# Patient Record
Sex: MALE | Race: WHITE | HISPANIC OR LATINO | URBAN - METROPOLITAN AREA
[De-identification: names, ages, dates, MRNs, and addresses within clinical notes are randomized per-mention and may not be internally consistent; named-entity substitution may affect disease eponyms.]

---

## 2019-01-30 ENCOUNTER — EMERGENCY (EMERGENCY)
Facility: HOSPITAL | Age: 29
LOS: 1 days | Discharge: HOME | End: 2019-01-30
Attending: EMERGENCY MEDICINE
Payer: SELF-PAY

## 2019-01-30 VITALS
WEIGHT: 179.9 LBS | HEIGHT: 68 IN | DIASTOLIC BLOOD PRESSURE: 84 MMHG | TEMPERATURE: 98 F | SYSTOLIC BLOOD PRESSURE: 116 MMHG | RESPIRATION RATE: 20 BRPM | OXYGEN SATURATION: 97 % | HEART RATE: 76 BPM

## 2019-01-30 VITALS
TEMPERATURE: 98 F | RESPIRATION RATE: 20 BRPM | OXYGEN SATURATION: 98 % | DIASTOLIC BLOOD PRESSURE: 93 MMHG | SYSTOLIC BLOOD PRESSURE: 124 MMHG | HEART RATE: 105 BPM

## 2019-01-30 DIAGNOSIS — F43.29 ADJUSTMENT DISORDER WITH OTHER SYMPTOMS: ICD-10-CM

## 2019-01-30 LAB
ALBUMIN SERPL ELPH-MCNC: 4.5 G/DL — SIGNIFICANT CHANGE UP (ref 3.3–5.2)
ALP SERPL-CCNC: 48 U/L — SIGNIFICANT CHANGE UP (ref 40–120)
ALT FLD-CCNC: 24 U/L — SIGNIFICANT CHANGE UP
AMPHET UR-MCNC: NEGATIVE — SIGNIFICANT CHANGE UP
ANION GAP SERPL CALC-SCNC: 13 MMOL/L — SIGNIFICANT CHANGE UP (ref 5–17)
AST SERPL-CCNC: 14 U/L — SIGNIFICANT CHANGE UP
BARBITURATES UR SCN-MCNC: NEGATIVE — SIGNIFICANT CHANGE UP
BASOPHILS # BLD AUTO: 0 K/UL — SIGNIFICANT CHANGE UP (ref 0–0.2)
BASOPHILS NFR BLD AUTO: 0.2 % — SIGNIFICANT CHANGE UP (ref 0–2)
BENZODIAZ UR-MCNC: NEGATIVE — SIGNIFICANT CHANGE UP
BILIRUB SERPL-MCNC: 0.3 MG/DL — LOW (ref 0.4–2)
BUN SERPL-MCNC: 15 MG/DL — SIGNIFICANT CHANGE UP (ref 8–20)
CALCIUM SERPL-MCNC: 9.2 MG/DL — SIGNIFICANT CHANGE UP (ref 8.6–10.2)
CHLORIDE SERPL-SCNC: 102 MMOL/L — SIGNIFICANT CHANGE UP (ref 98–107)
CO2 SERPL-SCNC: 26 MMOL/L — SIGNIFICANT CHANGE UP (ref 22–29)
COCAINE METAB.OTHER UR-MCNC: NEGATIVE — SIGNIFICANT CHANGE UP
CREAT SERPL-MCNC: 0.52 MG/DL — SIGNIFICANT CHANGE UP (ref 0.5–1.3)
EOSINOPHIL # BLD AUTO: 0.4 K/UL — SIGNIFICANT CHANGE UP (ref 0–0.5)
EOSINOPHIL NFR BLD AUTO: 3.4 % — SIGNIFICANT CHANGE UP (ref 0–5)
ETHANOL SERPL-MCNC: <10 MG/DL — SIGNIFICANT CHANGE UP
GLUCOSE SERPL-MCNC: 110 MG/DL — SIGNIFICANT CHANGE UP (ref 70–115)
HCT VFR BLD CALC: 40.9 % — LOW (ref 42–52)
HGB BLD-MCNC: 13.5 G/DL — LOW (ref 14–18)
LYMPHOCYTES # BLD AUTO: 27.1 % — SIGNIFICANT CHANGE UP (ref 20–55)
LYMPHOCYTES # BLD AUTO: 3.3 K/UL — SIGNIFICANT CHANGE UP (ref 1–4.8)
MCHC RBC-ENTMCNC: 26.7 PG — LOW (ref 27–31)
MCHC RBC-ENTMCNC: 33 G/DL — SIGNIFICANT CHANGE UP (ref 32–36)
MCV RBC AUTO: 80.8 FL — SIGNIFICANT CHANGE UP (ref 80–94)
METHADONE UR-MCNC: NEGATIVE — SIGNIFICANT CHANGE UP
MONOCYTES # BLD AUTO: 0.8 K/UL — SIGNIFICANT CHANGE UP (ref 0–0.8)
MONOCYTES NFR BLD AUTO: 7.1 % — SIGNIFICANT CHANGE UP (ref 3–10)
NEUTROPHILS # BLD AUTO: 7.5 K/UL — SIGNIFICANT CHANGE UP (ref 1.8–8)
NEUTROPHILS NFR BLD AUTO: 62 % — SIGNIFICANT CHANGE UP (ref 37–73)
OPIATES UR-MCNC: NEGATIVE — SIGNIFICANT CHANGE UP
PCP SPEC-MCNC: SIGNIFICANT CHANGE UP
PCP UR-MCNC: NEGATIVE — SIGNIFICANT CHANGE UP
PLATELET # BLD AUTO: 260 K/UL — SIGNIFICANT CHANGE UP (ref 150–400)
POTASSIUM SERPL-MCNC: 3.2 MMOL/L — LOW (ref 3.5–5.3)
POTASSIUM SERPL-SCNC: 3.2 MMOL/L — LOW (ref 3.5–5.3)
PROT SERPL-MCNC: 7.2 G/DL — SIGNIFICANT CHANGE UP (ref 6.6–8.7)
RBC # BLD: 5.06 M/UL — SIGNIFICANT CHANGE UP (ref 4.6–6.2)
RBC # FLD: 13.1 % — SIGNIFICANT CHANGE UP (ref 11–15.6)
SODIUM SERPL-SCNC: 141 MMOL/L — SIGNIFICANT CHANGE UP (ref 135–145)
THC UR QL: NEGATIVE — SIGNIFICANT CHANGE UP
WBC # BLD: 12 K/UL — HIGH (ref 4.8–10.8)
WBC # FLD AUTO: 12 K/UL — HIGH (ref 4.8–10.8)

## 2019-01-30 PROCEDURE — 99284 EMERGENCY DEPT VISIT MOD MDM: CPT | Mod: 25

## 2019-01-30 PROCEDURE — 90792 PSYCH DIAG EVAL W/MED SRVCS: CPT | Mod: GT

## 2019-01-30 PROCEDURE — 85027 COMPLETE CBC AUTOMATED: CPT

## 2019-01-30 PROCEDURE — 80307 DRUG TEST PRSMV CHEM ANLYZR: CPT

## 2019-01-30 PROCEDURE — 80053 COMPREHEN METABOLIC PANEL: CPT

## 2019-01-30 PROCEDURE — 99053 MED SERV 10PM-8AM 24 HR FAC: CPT

## 2019-01-30 PROCEDURE — 36415 COLL VENOUS BLD VENIPUNCTURE: CPT

## 2019-01-30 PROCEDURE — 93010 ELECTROCARDIOGRAM REPORT: CPT

## 2019-01-30 PROCEDURE — 93005 ELECTROCARDIOGRAM TRACING: CPT

## 2019-01-30 PROCEDURE — 99285 EMERGENCY DEPT VISIT HI MDM: CPT

## 2019-01-30 RX ORDER — POTASSIUM CHLORIDE 20 MEQ
40 PACKET (EA) ORAL ONCE
Qty: 0 | Refills: 0 | Status: COMPLETED | OUTPATIENT
Start: 2019-01-30 | End: 2019-01-30

## 2019-01-30 RX ADMIN — Medication 40 MILLIEQUIVALENT(S): at 03:30

## 2019-01-30 NOTE — ED ADULT NURSE REASSESSMENT NOTE - NS ED NURSE REASSESS COMMENT FT1
pt arrives to unit in yellow gown waned  for contraband by security.  pt yawning during interview.  poor eye contact.  minimal verbalization.  pt oriented to unit and surrounding made comfortable plan of care explained awaiting eval

## 2019-01-30 NOTE — ED BEHAVIORAL HEALTH ASSESSMENT NOTE - DETAILS
"yes I have firearms" per collateral vague suicidal statement when upset last night. no other hx of suicidality known. d/w mother, with whom patient resides

## 2019-01-30 NOTE — ED BEHAVIORAL HEALTH ASSESSMENT NOTE - SUMMARY
Patient is a 29 y/o M with no reported psych/medical hx, brought  in by EMS apparently activated by family because of reported concerns about suicidal ideation. Patient is a 27 y/o M with no reported psych/medical hx, brought  in by EMS apparently activated by family because of reported concerns about suicidal ideation.    patient presents as calm, somewhat evasive on interview. denies psych sx aside from stress. collateral reveals recent breakup and custody issues as major stressor. state patient made vague/passive suicidal statement once yesterday, with no hx of any psych illness or of any suicidality in past. collateral/patient w/o safety concerns.     no indication for involuntary commitment, and patient is not interested in voluntary. appropriate for d/c with outpatient resources.

## 2019-01-30 NOTE — ED BEHAVIORAL HEALTH ASSESSMENT NOTE - DESCRIPTION
denies domiciled with family. self employed. owns additional home in [place]. calm and in behavioral control w/o PRNs

## 2019-01-30 NOTE — ED BEHAVIORAL HEALTH ASSESSMENT NOTE - RISK ASSESSMENT
low acute risk  chronic rf: poor coping skills, owns firearms (though not currently in possession due to PD intervention)   acute rf: reported suicidal statement  pf: future oriented, no psych hx, no substance abuse

## 2019-01-30 NOTE — ED ADULT NURSE REASSESSMENT NOTE - DESCRIPTION
arrives to unit in yellow gown report received.  as per pt I was driving around and they thought I was going to hurt myself.  pt denies s/i denies h/i denies ah/vh.  pt yawning during interview.  with miminal verbalization appears annoyed.  oriented to unit surrounds made comfortable.  po offered refused at this time

## 2019-01-30 NOTE — ED PROVIDER NOTE - OBJECTIVE STATEMENT
29 y/o M presents to ED with [facility] c/o suicidal ideation onset tonight. As per patient, he states "my family called because they were concerned I was going to hurt myself. As per [facility], the patient was on the phone with his ex-girlfriend and they got into a dispute. After the argument, he was at his aunt's house and made the statement "I just cannot do it anymore, I want to kill myself". The patient then went outside to his car to drive home, but [facility] was called. They state the patient has 5 firearms in his house currently. Denies homicidal ideation, hallucinations, drug use or EtOH use. No previous suicide attempts. NKDA. No further acute complaints at this time.

## 2019-01-30 NOTE — ED ADULT NURSE NOTE - CAS DISCH CONDITION
Stable/Pt alert and oriented. Friendly and cooporative, Pt denies S/I and H/I  all belonging returned

## 2019-01-30 NOTE — ED ADULT NURSE REASSESSMENT NOTE - NSIMPLEMENTINTERV_GEN_ALL_ED
Implemented All Universal Safety Interventions:  [place] to call system. Call bell, personal items and telephone within reach. Instruct patient to call for assistance. Room bathroom lighting operational. Non-slip footwear when patient is off stretcher. Physically safe environment: no spills, clutter or unnecessary equipment. Stretcher in lowest position, wheels locked, appropriate side rails in place.

## 2019-01-30 NOTE — ED BEHAVIORAL HEALTH ASSESSMENT NOTE - HPI (INCLUDE ILLNESS QUALITY, SEVERITY, DURATION, TIMING, CONTEXT, MODIFYING FACTORS, ASSOCIATED SIGNS AND SYMPTOMS)
Patient is a 29 y/o M with no reported psych/medical hx, brought  in by EMS apparently activated by family because of reported concerns about suicidal ideation.     Patient seen by telepsychiatry. he is minimally cooperative with interview, proving evasive and providing one very brief responses "not that I recall," or "I don't remember" to most questions. He is not able to elaborate in general. he says he is here because "people got worried." when asked who was worried he says "my family." when pressed, he says that they were worried "because they didn't know where I was." while he says that nothing like this has ever happened before, he does not find it unusual that his family would call the police when they do not know his exact whereabouts. he denies hx of SI/HI/AVH/PI/manic sx. He does not answer when asked about reported suicidal ideation (see triage note), saying "I can't recall." He denies having problems with his memory. denies anxiety. does say he has been somewhat "stressed" lately, is very vague when asked why he is stressed. denies anhedonia/appetite change/low energy. he does not feel he needs to be in the hospital. Patient is a 29 y/o M with no reported psych/medical hx, brought  in by EMS apparently activated by family because of reported concerns about suicidal ideation.     Patient seen by telepsychiatry. he is minimally cooperative with interview, proving evasive and providing one very brief responses "not that I recall," or "I don't remember" to most questions. He is not able to elaborate in general. he says he is here because "people got worried." when asked who was worried he says "my family." when pressed, he says that they were worried "because they didn't know where I was." while he says that nothing like this has ever happened before, he does not find it unusual that his family would call the police when they do not know his exact whereabouts. he denies hx of SI/HI/AVH/PI/manic sx. He does not answer when asked about reported suicidal ideation (see triage note), saying "I can't recall." He denies having problems with his memory. denies anxiety. does say he has been somewhat "stressed" lately, is very vague when asked why he is stressed. denies anhedonia/appetite change/low energy. he does not feel he needs to be in the hospital.    MD spoke with Shanika, patient's mother, for collateral: She states patient has been stressed lately.   Patient and his GF broke up, and they have a baby together. Patient wants her to come back while his ex GF is not receptive to this idea. He has been seeking  from family members. He seemed been “mopey.” he is still functioning, going to work, attending to ADLs.  last night patient was at his aunt’s house and then did not return to his mother’s house. Aunt called mother to state that patient was “not in a good place” and she called police because he had turned off his phone. He had said that he was upset and “maybe it was better if I wasn’t in the picture.” No psych hx, has never done anything like this. she states that patient did own firearms but they were confiscated by PD last night.

## 2019-01-30 NOTE — ED ADULT NURSE NOTE - OBJECTIVE STATEMENT
pt arrives to  unit in yellow gown states I went for a drive. they though I was going to hurt myself

## 2019-01-30 NOTE — ED PROVIDER NOTE - PSYCHIATRIC, MLM
Alert and oriented to person, place, time/situation. normal mood and somewhat flat affect. verbalized ideas of self harm.

## 2019-01-30 NOTE — ED ADULT TRIAGE NOTE - CHIEF COMPLAINT QUOTE
As per [facility] (5414) patient verbalized thoughts of wanting to kill himself after verbal argument with ex-girlfriend. In ED patient states he was "going for a drive to clear my head" currently denies any thoughts of SI or HI
